# Patient Record
Sex: FEMALE | Race: WHITE | NOT HISPANIC OR LATINO | Employment: UNEMPLOYED | URBAN - METROPOLITAN AREA
[De-identification: names, ages, dates, MRNs, and addresses within clinical notes are randomized per-mention and may not be internally consistent; named-entity substitution may affect disease eponyms.]

---

## 2022-07-24 ENCOUNTER — APPOINTMENT (OUTPATIENT)
Dept: MRI IMAGING | Facility: HOSPITAL | Age: 57
End: 2022-07-24

## 2022-07-24 ENCOUNTER — APPOINTMENT (OUTPATIENT)
Dept: GENERAL RADIOLOGY | Facility: HOSPITAL | Age: 57
End: 2022-07-24

## 2022-07-24 ENCOUNTER — HOSPITAL ENCOUNTER (EMERGENCY)
Facility: HOSPITAL | Age: 57
Discharge: HOME | End: 2022-07-24
Attending: EMERGENCY MEDICINE | Admitting: EMERGENCY MEDICINE

## 2022-07-24 VITALS
TEMPERATURE: 98.1 F | OXYGEN SATURATION: 98 % | BODY MASS INDEX: 32.14 KG/M2 | HEART RATE: 70 BPM | HEIGHT: 66 IN | DIASTOLIC BLOOD PRESSURE: 74 MMHG | SYSTOLIC BLOOD PRESSURE: 126 MMHG | WEIGHT: 200 LBS | RESPIRATION RATE: 18 BRPM

## 2022-07-24 DIAGNOSIS — S43.401A SPRAIN OF RIGHT SHOULDER, UNSPECIFIED SHOULDER SPRAIN TYPE, INITIAL ENCOUNTER: Primary | ICD-10-CM

## 2022-07-24 PROCEDURE — 99283 EMERGENCY DEPT VISIT LOW MDM: CPT

## 2022-07-24 PROCEDURE — 73030 X-RAY EXAM OF SHOULDER: CPT

## 2022-07-24 RX ORDER — LORAZEPAM 1 MG/1
1 TABLET ORAL ONCE
Status: COMPLETED | OUTPATIENT
Start: 2022-07-24 | End: 2022-07-24

## 2022-07-24 RX ADMIN — LORAZEPAM 1 MG: 1 TABLET ORAL at 12:52

## 2022-07-24 NOTE — ED PROVIDER NOTES
EMERGENCY DEPARTMENT ENCOUNTER    Pt Name: Nadine Arceo  MRN: [id number]  Pt :   1965  Room Number:  26SF/26  Date of encounter:  2022  PCP: Provider, No Known  ED Provider: Mkia Mack PA-C    Historian:       HPI:  Chief Complaint: Patient        Context: Nadine Arceo is a 57 y.o. female who presents to the ED c/o falling yesterday when she was helping her sister move a box.  She fell landing on her elbow, and now is unable to flex her shoulder without assistance.  She is able to extend minimally as well as minimally internally rotate and externally rotate, and slightly ABduct the shoulder she denies pain, just inability to flex her right shoulder.  She denies other symptoms or injuries.  No distal paresis or paresthesias.      PAST MEDICAL HISTORY  No past medical history on file.      PAST SURGICAL HISTORY  No past surgical history on file.      FAMILY HISTORY  No family history on file.      SOCIAL HISTORY  Social History     Socioeconomic History   • Marital status:          ALLERGIES  Patient has no known allergies.        REVIEW OF SYSTEMS  Review of Systems   Constitutional: Positive for activity change. Negative for chills, fatigue and fever.   HENT: Negative for congestion, rhinorrhea and sore throat.    Respiratory: Negative for cough, shortness of breath and wheezing.    Cardiovascular: Negative for chest pain and palpitations.   Gastrointestinal: Negative for abdominal pain, nausea and vomiting.   Genitourinary: Negative for dysuria, flank pain and hematuria.   Musculoskeletal: Negative for arthralgias, back pain, myalgias and neck stiffness.   Neurological: Negative for dizziness, seizures, syncope and headaches.          All systems reviewed and negative except for those discussed in HPI.       PHYSICAL EXAM    I have reviewed the triage vital signs and nursing notes.    ED Triage Vitals [22 1155]   Temp Heart Rate Resp BP SpO2   98.1 °F (36.7 °C) 70 18 126/74 98  %      Temp src Heart Rate Source Patient Position BP Location FiO2 (%)   Oral Monitor Sitting Right arm --       Physical Exam  GENERAL:   Pleasant awake alert and oriented nontoxic-appearing afebrile hemodynamic stable, not acute distress.  HENT: Nares patent.  EYES: No scleral icterus.  CV: Regular rhythm, regular rate.  RESPIRATORY: Normal effort.  No audible wheezes, rales or rhonchi.  ABDOMEN: Soft, nontender  MUSCULOSKELETAL: No deformities.  Patient is unable to flex her shoulder anteriorly.  She is able to minimally AB duct, extend, and internally rotate but just minimally.  Neurovascular status is intact distally.  NEURO: Alert, moves all extremities, follows commands.  SKIN: Warm, dry, no rash visualized.        LAB RESULTS  No results found for this or any previous visit (from the past 24 hour(s)).    If labs were ordered, I independently reviewed the results.        RADIOLOGY  XR Shoulder 2+ View Right    Result Date: 7/24/2022  DATE OF EXAM: 7/24/2022 2:03 PM  PROCEDURE: XR SHOULDER 2+ VW RIGHT-  INDICATIONS: fell on R elbow yesterday, cannot move shoulder today  COMPARISON: No comparisons available.  TECHNIQUE: A minimum of two radiologic views of the right shoulder were obtained.  FINDINGS: No evidence of fracture or dislocation. Irregularity of the greater tuberosity which may indicate chronic shoulder impingement. Acromioclavicular osteoarthritis. Incidental bone island in the shaft of the humerus      Negative for fracture or dislocation  This report was finalized on 7/24/2022 2:16 PM by Jaciel Taylor.          PROCEDURES    Procedures    No orders to display       MEDICATIONS GIVEN IN ER    Medications   LORazepam (ATIVAN) tablet 1 mg (1 mg Oral Given 7/24/22 1252)         PROGRESS, DATA ANALYSIS, CONSULTS, AND MEDICAL DECISION MAKING    All labs have been independently reviewed by me.  All radiology studies have been reviewed by me and the radiologist dictating the report.   EKG's have been  independently viewed and interpreted by me.      Differential diagnoses: Brachial plexus injury, rotator cuff injury, dislocation/fracture           Patient was unable to tolerate MRI.  Plain films were negative for acute findings.  We will place her in a shoulder immobilizer, refer to orthopedics.  Short-term prednisone for inflammation, return to emergency department any change or worsening.  She verbalizes understanding and is agreeable to plan.      AS OF 22:07 EDT VITALS:    BP - 126/74  HR - 70  TEMP - 98.1 °F (36.7 °C) (Oral)  O2 SATS - 98%                  DIAGNOSIS  Final diagnoses:   Sprain of right shoulder, unspecified shoulder sprain type, initial encounter         DISPOSITION  DISCHARGE    Patient discharged in stable condition.    Reviewed implications of results, diagnosis, meds, responsibility to follow up, warning signs and symptoms of possible worsening, potential complications and reasons to return to ER.    Patient/Family voiced understanding of above instructions.    Discussed plan for discharge, as there is no emergent indication for admission.  Pt/family is agreeable and understands need for follow up and possible repeat testing.  Pt/family is aware that discharge does not mean that nothing is wrong but that it indicates no emergency is currently present that requires admission and they must continue care with follow-up as given below or with a physician of their choice.     FOLLOW-UP  David Moreno MD  [address] [place] [address] [phone]    Schedule an appointment as soon as possible for a visit   For further evaluation of your shoulder.         Medication List      No changes were made to your prescriptions during this visit.                Mika Mack PA-C  07/24/22 [id number]

## 2024-02-27 ENCOUNTER — HOSPITAL ENCOUNTER (EMERGENCY)
Facility: HOSPITAL | Age: 59
Discharge: HOME | End: 2024-02-27
Attending: EMERGENCY MEDICINE | Admitting: EMERGENCY MEDICINE
Payer: COMMERCIAL

## 2024-02-27 ENCOUNTER — APPOINTMENT (OUTPATIENT)
Dept: CT IMAGING | Facility: HOSPITAL | Age: 59
End: 2024-02-27
Payer: COMMERCIAL

## 2024-02-27 VITALS
RESPIRATION RATE: 16 BRPM | SYSTOLIC BLOOD PRESSURE: 103 MMHG | OXYGEN SATURATION: 94 % | DIASTOLIC BLOOD PRESSURE: 56 MMHG | HEIGHT: 66 IN | HEART RATE: 55 BPM | WEIGHT: 200 LBS | TEMPERATURE: 98 F | BODY MASS INDEX: 32.14 KG/M2

## 2024-02-27 DIAGNOSIS — R55 SYNCOPE AND COLLAPSE: Primary | ICD-10-CM

## 2024-02-27 LAB
ANION GAP SERPL CALCULATED.3IONS-SCNC: 13 MMOL/L (ref 5–15)
BASOPHILS # BLD AUTO: 0.04 10*3/MM3 (ref 0–0.2)
BASOPHILS NFR BLD AUTO: 0.6 % (ref 0–1.5)
BILIRUB UR QL STRIP: NEGATIVE
BUN SERPL-MCNC: 15 MG/DL (ref 6–20)
BUN/CREAT SERPL: 17.6 (ref 7–25)
CALCIUM SPEC-SCNC: 8.8 MG/DL (ref 8.6–10.5)
CHLORIDE SERPL-SCNC: 101 MMOL/L (ref 98–107)
CLARITY UR: CLEAR
CO2 SERPL-SCNC: 24 MMOL/L (ref 22–29)
COLOR UR: YELLOW
CREAT SERPL-MCNC: 0.85 MG/DL (ref 0.57–1)
DEPRECATED RDW RBC AUTO: 38.4 FL (ref 37–54)
EGFRCR SERPLBLD CKD-EPI 2021: 80 ML/MIN/1.73
EOSINOPHIL # BLD AUTO: 0.07 10*3/MM3 (ref 0–0.4)
EOSINOPHIL NFR BLD AUTO: 1.1 % (ref 0.3–6.2)
ERYTHROCYTE [DISTWIDTH] IN BLOOD BY AUTOMATED COUNT: 11.9 % (ref 12.3–15.4)
GLUCOSE SERPL-MCNC: 107 MG/DL (ref 65–99)
GLUCOSE UR STRIP-MCNC: NEGATIVE MG/DL
HCT VFR BLD AUTO: 41.6 % (ref 34–46.6)
HGB BLD-MCNC: 13.9 G/DL (ref 12–15.9)
HGB UR QL STRIP.AUTO: NEGATIVE
IMM GRANULOCYTES # BLD AUTO: 0 10*3/MM3 (ref 0–0.05)
IMM GRANULOCYTES NFR BLD AUTO: 0 % (ref 0–0.5)
KETONES UR QL STRIP: NEGATIVE
LEUKOCYTE ESTERASE UR QL STRIP.AUTO: NEGATIVE
LYMPHOCYTES # BLD AUTO: 1.74 10*3/MM3 (ref 0.7–3.1)
LYMPHOCYTES NFR BLD AUTO: 27.2 % (ref 19.6–45.3)
MCH RBC QN AUTO: 29.6 PG (ref 26.6–33)
MCHC RBC AUTO-ENTMCNC: 33.4 G/DL (ref 31.5–35.7)
MCV RBC AUTO: 88.7 FL (ref 79–97)
MONOCYTES # BLD AUTO: 0.36 10*3/MM3 (ref 0.1–0.9)
MONOCYTES NFR BLD AUTO: 5.6 % (ref 5–12)
NEUTROPHILS NFR BLD AUTO: 4.18 10*3/MM3 (ref 1.7–7)
NEUTROPHILS NFR BLD AUTO: 65.5 % (ref 42.7–76)
NITRITE UR QL STRIP: NEGATIVE
NRBC BLD AUTO-RTO: 0 /100 WBC (ref 0–0.2)
PH UR STRIP.AUTO: 6 [PH] (ref 5–8)
PLATELET # BLD AUTO: 183 10*3/MM3 (ref 140–450)
PMV BLD AUTO: 9.3 FL (ref 6–12)
POTASSIUM SERPL-SCNC: 3.5 MMOL/L (ref 3.5–5.2)
PROT UR QL STRIP: NEGATIVE
RBC # BLD AUTO: 4.69 10*6/MM3 (ref 3.77–5.28)
SODIUM SERPL-SCNC: 138 MMOL/L (ref 136–145)
SP GR UR STRIP: 1.01 (ref 1–1.03)
TROPONIN T SERPL HS-MCNC: <6 NG/L
UROBILINOGEN UR QL STRIP: NORMAL
WBC NRBC COR # BLD AUTO: 6.39 10*3/MM3 (ref 3.4–10.8)

## 2024-02-27 PROCEDURE — 80048 BASIC METABOLIC PNL TOTAL CA: CPT | Performed by: EMERGENCY MEDICINE

## 2024-02-27 PROCEDURE — 70450 CT HEAD/BRAIN W/O DYE: CPT

## 2024-02-27 PROCEDURE — 81003 URINALYSIS AUTO W/O SCOPE: CPT | Performed by: EMERGENCY MEDICINE

## 2024-02-27 PROCEDURE — 93005 ELECTROCARDIOGRAM TRACING: CPT | Performed by: EMERGENCY MEDICINE

## 2024-02-27 PROCEDURE — 25810000003 SODIUM CHLORIDE 0.9 % SOLUTION: Performed by: EMERGENCY MEDICINE

## 2024-02-27 PROCEDURE — 84484 ASSAY OF TROPONIN QUANT: CPT | Performed by: EMERGENCY MEDICINE

## 2024-02-27 PROCEDURE — 99284 EMERGENCY DEPT VISIT MOD MDM: CPT

## 2024-02-27 PROCEDURE — 85025 COMPLETE CBC W/AUTO DIFF WBC: CPT | Performed by: EMERGENCY MEDICINE

## 2024-02-27 RX ORDER — SERTRALINE HYDROCHLORIDE 100 MG/1
100 TABLET, FILM COATED ORAL DAILY
COMMUNITY

## 2024-02-27 RX ORDER — ACETAMINOPHEN 325 MG/1
650 TABLET ORAL ONCE
Status: COMPLETED | OUTPATIENT
Start: 2024-02-27 | End: 2024-02-27

## 2024-02-27 RX ADMIN — SODIUM CHLORIDE 1000 ML: 9 INJECTION, SOLUTION INTRAVENOUS at 01:45

## 2024-02-27 RX ADMIN — ACETAMINOPHEN 650 MG: 325 TABLET ORAL at 01:45

## 2024-02-27 NOTE — ED PROVIDER NOTES
Subjective   History of Present Illness  Mrs. Arceo arrives by ambulance from home after having a syncopal episode.  She tells me she is currently staying with her mother.  She was asleep and heard her mother cry out for pain medication.  She got up and went to the kitchen and grabbed her pain medication and was on her way back to her mother's room when she began feeling flushed.  She tells me she does not remember what happened next until a few minutes later.  Her mother told her that she had a glazed look in her eyes and that she fell a couple of times onto her head and left side.  She did not wet herself or bite her tongue.  She tells me now she feels like her eyes are glazed.  She reports headache.  She denies recent illnesses.      Review of Systems    Past Medical History:   Diagnosis Date    Anxiety     Hyperlipemia        No Known Allergies    History reviewed. No pertinent surgical history.    History reviewed. No pertinent family history.    Social History     Socioeconomic History    Marital status:    Tobacco Use    Smoking status: Never    Smokeless tobacco: Never   Substance and Sexual Activity    Alcohol use: Not Currently    Drug use: Never    Sexual activity: Defer           Objective   Physical Exam  Vitals and nursing note reviewed.   Constitutional:       General: She is not in acute distress.     Appearance: Normal appearance.   HENT:      Head: Normocephalic and atraumatic.      Nose: Nose normal. No congestion or rhinorrhea.   Eyes:      General: No scleral icterus.     Conjunctiva/sclera: Conjunctivae normal.   Neck:      Comments: No JVD   Cardiovascular:      Rate and Rhythm: Normal rate and regular rhythm.      Heart sounds: No murmur heard.     No friction rub.   Pulmonary:      Effort: Pulmonary effort is normal.      Breath sounds: Normal breath sounds. No wheezing or rales.   Abdominal:      General: Bowel sounds are normal.      Palpations: Abdomen is soft.      Tenderness:  There is no abdominal tenderness. There is no guarding or rebound.   Musculoskeletal:         General: No tenderness.      Cervical back: Normal range of motion and neck supple.      Right lower leg: No edema.      Left lower leg: No edema.   Skin:     General: Skin is warm and dry.      Coloration: Skin is not pale.      Findings: No erythema.   Neurological:      General: No focal deficit present.      Mental Status: She is alert and oriented to person, place, and time.      Motor: No weakness.      Coordination: Coordination normal.   Psychiatric:         Mood and Affect: Mood normal.         Behavior: Behavior normal.         Thought Content: Thought content normal.         Procedures           ED Course  ED Course as of 02/27/24 0315 Tue Feb 27, 2024 0306 She tells me she feels fine will discharge and refer to syncope clinic [DT]      ED Course User Index  [DT] Shantanu Bauer MD                                             Medical Decision Making  Please see course notes.  I reviewed and interpreted multiple labs and gave IV fluids and observed her for couple of hours in the emergency department.    Problems Addressed:  Syncope and collapse: complicated acute illness or injury that poses a threat to life or bodily functions    Amount and/or Complexity of Data Reviewed  Labs: ordered. Decision-making details documented in ED Course.  Radiology: ordered. Decision-making details documented in ED Course.  ECG/medicine tests: ordered. Decision-making details documented in ED Course.    Risk  OTC drugs.        Final diagnoses:   Syncope and collapse       ED Disposition  ED Disposition       ED Disposition   Discharge    Condition   Stable    Comment   --               PATIENT CONNECTION - [facility] [id number]  [phone]             Medication List      No changes were made to your prescriptions during this visit.            Shantanu Bauer MD  02/27/24 0313

## 2024-02-27 NOTE — DISCHARGE INSTRUCTIONS
Drink extra fluids.  If you feel flushed or feel like you might pass out then lay down until the feeling passes.  Return anytime if further falls or any other concerns.  Call the patient connection number to establish with a primary care provider and for follow-up.  I have sent in a referral to the syncope clinic as we discussed.  A nurse from the clinic will call you in the next day to arrange follow-up for further evaluation.

## 2024-03-03 LAB
QT INTERVAL: 438 MS
QTC INTERVAL: 438 MS